# Patient Record
Sex: MALE | Race: WHITE | NOT HISPANIC OR LATINO | URBAN - METROPOLITAN AREA
[De-identification: names, ages, dates, MRNs, and addresses within clinical notes are randomized per-mention and may not be internally consistent; named-entity substitution may affect disease eponyms.]

---

## 2018-12-17 ENCOUNTER — EMERGENCY (EMERGENCY)
Facility: HOSPITAL | Age: 48
LOS: 1 days | Discharge: HOME | End: 2018-12-17
Admitting: EMERGENCY MEDICINE
Payer: COMMERCIAL

## 2018-12-17 VITALS
TEMPERATURE: 97 F | OXYGEN SATURATION: 100 % | HEART RATE: 63 BPM | SYSTOLIC BLOOD PRESSURE: 119 MMHG | RESPIRATION RATE: 16 BRPM | DIASTOLIC BLOOD PRESSURE: 83 MMHG

## 2018-12-17 VITALS — OXYGEN SATURATION: 100 % | HEART RATE: 74 BPM | RESPIRATION RATE: 17 BRPM

## 2018-12-17 LAB
ALBUMIN SERPL ELPH-MCNC: 4.2 G/DL — SIGNIFICANT CHANGE UP (ref 3.4–5)
ALP SERPL-CCNC: 78 U/L — SIGNIFICANT CHANGE UP (ref 40–120)
ALT FLD-CCNC: 32 U/L — SIGNIFICANT CHANGE UP (ref 12–42)
ANION GAP SERPL CALC-SCNC: 8 MMOL/L — LOW (ref 9–16)
AST SERPL-CCNC: 28 U/L — SIGNIFICANT CHANGE UP (ref 15–37)
BILIRUB SERPL-MCNC: 0.6 MG/DL — SIGNIFICANT CHANGE UP (ref 0.2–1.2)
BUN SERPL-MCNC: 8 MG/DL — SIGNIFICANT CHANGE UP (ref 7–23)
CALCIUM SERPL-MCNC: 9.6 MG/DL — SIGNIFICANT CHANGE UP (ref 8.5–10.5)
CHLORIDE SERPL-SCNC: 104 MMOL/L — SIGNIFICANT CHANGE UP (ref 96–108)
CO2 SERPL-SCNC: 31 MMOL/L — SIGNIFICANT CHANGE UP (ref 22–31)
CREAT SERPL-MCNC: 0.88 MG/DL — SIGNIFICANT CHANGE UP (ref 0.5–1.3)
D DIMER BLD IA.RAPID-MCNC: <187 NG/ML DDU — SIGNIFICANT CHANGE UP
GLUCOSE SERPL-MCNC: 101 MG/DL — HIGH (ref 70–99)
HCT VFR BLD CALC: 38 % — LOW (ref 39–50)
HGB BLD-MCNC: 13.1 G/DL — SIGNIFICANT CHANGE UP (ref 13–17)
MCHC RBC-ENTMCNC: 31.7 PG — SIGNIFICANT CHANGE UP (ref 27–34)
MCHC RBC-ENTMCNC: 34.5 G/DL — SIGNIFICANT CHANGE UP (ref 32–36)
MCV RBC AUTO: 92 FL — SIGNIFICANT CHANGE UP (ref 80–100)
NT-PROBNP SERPL-SCNC: 54 PG/ML — SIGNIFICANT CHANGE UP
PLATELET # BLD AUTO: 234 K/UL — SIGNIFICANT CHANGE UP (ref 150–400)
POTASSIUM SERPL-MCNC: 3.9 MMOL/L — SIGNIFICANT CHANGE UP (ref 3.5–5.3)
POTASSIUM SERPL-SCNC: 3.9 MMOL/L — SIGNIFICANT CHANGE UP (ref 3.5–5.3)
PROT SERPL-MCNC: 7.4 G/DL — SIGNIFICANT CHANGE UP (ref 6.4–8.2)
RBC # BLD: 4.13 M/UL — LOW (ref 4.2–5.8)
RBC # FLD: 12.1 % — SIGNIFICANT CHANGE UP (ref 10.3–14.5)
SODIUM SERPL-SCNC: 143 MMOL/L — SIGNIFICANT CHANGE UP (ref 132–145)
TROPONIN I SERPL-MCNC: <0.017 NG/ML — LOW (ref 0.02–0.06)
WBC # BLD: 4.8 K/UL — SIGNIFICANT CHANGE UP (ref 3.8–10.5)
WBC # FLD AUTO: 4.8 K/UL — SIGNIFICANT CHANGE UP (ref 3.8–10.5)

## 2018-12-17 PROCEDURE — 99284 EMERGENCY DEPT VISIT MOD MDM: CPT

## 2018-12-17 PROCEDURE — 71046 X-RAY EXAM CHEST 2 VIEWS: CPT | Mod: 26

## 2018-12-17 PROCEDURE — 93010 ELECTROCARDIOGRAM REPORT: CPT

## 2018-12-17 PROCEDURE — 99285 EMERGENCY DEPT VISIT HI MDM: CPT | Mod: 25

## 2018-12-17 RX ORDER — SODIUM CHLORIDE 9 MG/ML
3 INJECTION INTRAMUSCULAR; INTRAVENOUS; SUBCUTANEOUS ONCE
Qty: 0 | Refills: 0 | Status: COMPLETED | OUTPATIENT
Start: 2018-12-17 | End: 2018-12-17

## 2018-12-17 RX ADMIN — SODIUM CHLORIDE 3 MILLILITER(S): 9 INJECTION INTRAMUSCULAR; INTRAVENOUS; SUBCUTANEOUS at 15:11

## 2018-12-17 NOTE — ED ADULT NURSE NOTE - OBJECTIVE STATEMENT
c/o mid sternal chest pain and bilateral hand tingling at work. Denies SOB, diaphoresis, nausea, or lightheadedness. Denies chest pain at this time. EKG Sinus bradycardia. Seen by provider. will continue to monitor.

## 2018-12-17 NOTE — ED PROVIDER NOTE - MEDICAL DECISION MAKING DETAILS
49 y/o M presents to ED c/o transient chest pain.  Pt well appearing. VSS. NAD.  Labs wnl.  CXR negative. 49 y/o M presents to ED c/o transient chest pain.  Pt well appearing. VSS. NAD.  Labs wnl.  CXR negative.  d-dimer negative.  Pt discharged home and advised to f/u with cardiologist.

## 2018-12-17 NOTE — ED ADULT NURSE NOTE - NSIMPLEMENTINTERV_GEN_ALL_ED
Implemented All Universal Safety Interventions:  [place] to call system. Call bell, personal items and telephone within reach. Instruct patient to call for assistance. Room bathroom lighting operational. Non-slip footwear when patient is off stretcher. Physically safe environment: no spills, clutter or unnecessary equipment. Stretcher in lowest position, wheels locked, appropriate side rails in place.

## 2018-12-17 NOTE — ED PROVIDER NOTE - CARE PROVIDER_API CALL
Armando Jenkins), Cardiovascular Disease; Internal Medicine  [address] [facility]  [address] [facility] [place] [address]  Phone: [phone]  Fax: [phone]

## 2018-12-17 NOTE — CONSULT NOTE ADULT - SUBJECTIVE AND OBJECTIVE BOX
[facility] Consultation    CHIEF COMPLAINT: CP    HISTORY OF PRESENT ILLNESS: 49 y/o male seen today secondary to chest pain. The discomfort was left sided. It lasted about 20 min. Symptoms noted at rest. They went away by themselves. No prior such complains or cardiac testing. Currently, he is pain free.     PAST MEDICAL & SURGICAL HISTORY:  Varicocele    Allergies No Known Allergies    MEDICATIONS: none    FAMILY HISTORY: unremarkable    SOCIAL HISTORY:  no EtOH, tobacco or drug use    REVIEW OF SYSTEMS:  CONSTITUTIONAL: No fever, weight loss, or fatigue  EYES: No eye pain, visual disturbances, or discharge  ENMT:  No difficulty hearing, tinnitus, vertigo; No sinus or throat pain  NECK: No pain or stiffness  BREASTS: No pain, masses, or nipple discharge  RESPIRATORY: No cough, wheezing, chills or hemoptysis; No Shortness of Breath  CARDIOVASCULAR: No chest pain, palpitations, dizziness, or leg swelling  GASTROINTESTINAL: No abdominal or epigastric pain. No nausea, vomiting, or hematemesis; No diarrhea or constipation. No melena or hematochezia.  GENITOURINARY: No dysuria, frequency, hematuria, or incontinence  NEUROLOGICAL: No headaches, memory loss, loss of strength, numbness, or tremors  SKIN: No itching, burning, rashes, or lesions   LYMPH Nodes: No enlarged glands  ENDOCRINE: No heat or cold intolerance; No hair loss  MUSCULOSKELETAL: No joint pain or swelling; No muscle, back, or extremity pain  PSYCHIATRIC: No depression, anxiety, mood swings, or difficulty sleeping  HEME/LYMPH: No easy bruising, or bleeding gums  ALLERY AND IMMUNOLOGIC: No hives or eczema	      PHYSICAL EXAM:  T(C): 36.3 (12-17-18 @ 14:09), Max: 36.3 (12-17-18 @ 14:09)  HR: 74 (12-17-18 @ 17:15) (63 - 74)  BP: 119/83 (12-17-18 @ 14:09) (119/83 - 119/83)  RR: 17 (12-17-18 @ 17:15) (16 - 17)  SpO2: 100% (12-17-18 @ 17:15) (100% - 100%)      Appearance: middle aged man NAD	  HEENT:   Normal oral mucosa, PERRL, EOMI	  Lymphatic: No lymphadenopathy  Cardiovascular: Normal S1 S2, No JVD, No murmurs, No edema  Respiratory: Lungs clear to auscultation	  Psychiatry: A & O x 3, Mood & affect appropriate  Gastrointestinal:  Soft, Non-tender, + BS	  Skin: No rashes, No ecchymoses, No cyanosis	  Neurologic: Non-focal  Extremities: Normal range of motion, No clubbing, cyanosis or edema  Vascular: Peripheral pulses palpable 2+ bilaterally  	    ECG:  	SR no ST-T changes    LABS:	 	  CARDIAC MARKERS:  Troponin I, Serum: <0.017 ng/mL (12-17 @ 15:12)                       13.1   4.8   )-----------( 234      ( 17 Dec 2018 15:12 )             38.0     12-17    143  |  104  |  8   ----------------------------<  101<H>  3.9   |  31  |  0.88    Ca    9.6      17 Dec 2018 15:12    TPro  7.4  /  Alb  4.2  /  TBili  0.6  /  DBili  x   /  AST  28  /  ALT  32  /  AlkPhos  78  12-17    proBNP: Serum Pro-Brain Natriuretic Peptide: 54 pg/mL (12-17 @ 15:12)        ASSESSMENT/PLAN: 	49 y/o male atypical chest pain  1. patient seen and examined, chart reviewed  2. stable for discharge  3. happy to see in the office   4. patient could benefit from a stress test    I spent 45 min in care of this patient

## 2018-12-17 NOTE — ED PROVIDER NOTE - NSFOLLOWUPINSTRUCTIONS_ED_ALL_ED_FT
Follow up with cardiologist.  Call for follow up appointment.   Return for worsening or recurrent symptoms.

## 2018-12-17 NOTE — ED PROVIDER NOTE - OBJECTIVE STATEMENT
49 y/o M presents to ED presents to ED c/o 47 y/o M presents to ED presents to ED, sent in by MD at work for evaluation.  Pt c/o of L sided chest pain described as a localized area of tension that occurred for approx 20 minutes while seated at work.  He also notes tingling to bilat finger tips at time of pain.  He denies fevers/chills, palpitations, shortness of breath, n/v/d, diaphoresis, visual changes, cough, weakness.  Of note, pt had recent embolization of L leg on 11/12/18.  He denies FMH of CAD/MI/CVA.  Pt denies smoking history, illicit drug use.  Pt no longer has any pain.

## 2018-12-17 NOTE — ED ADULT TRIAGE NOTE - CHIEF COMPLAINT QUOTE
Patient c/o mid sternal chest pain that started at work and tingling of hands. Denies diaphoresis or SOB. EKG in progress.

## 2018-12-17 NOTE — ED PROVIDER NOTE - CARDIAC, MLM
Normal rate, regular rhythm.  Heart sounds S1, S2.  No murmurs, rubs or gallops.  no chest wall tenderness Normal rate, regular rhythm.  Heart sounds S1, S2.  No murmurs, rubs or gallops.  no chest wall tenderness, no lower leg edema

## 2018-12-21 DIAGNOSIS — R07.89 OTHER CHEST PAIN: ICD-10-CM

## 2019-08-24 NOTE — ED PROVIDER NOTE - CONSTITUTIONAL, MLM
24-Aug-2019 13:02 normal... Well appearing, well nourished, awake, alert, oriented to person, place, time/situation and in no apparent distress.

## 2022-10-28 NOTE — ED PROVIDER NOTE - CPE EDP NEURO NORM
- - - Ears: no ear pain and no hearing problems. Nose: no nasal congestion and no nasal drainage. Mouth/Throat: no dysphagia, no hoarseness and no throat pain. Neck: no lumps, no pain, no stiffness and no swollen glands.

## 2024-12-19 NOTE — ED PROVIDER NOTE - EKG #1 DATE/TIME
Procedure To Be Performed At Next Visit: Excision
Detail Level: Detailed
X Size Of Lesion In Cm (Optional): 0
Introduction Text (Please End With A Colon): The following procedure was deferred:
17-Dec-2018 17:11

## 2025-07-23 NOTE — ED ADULT NURSE NOTE - NS_NURSE_DISC_TEACHING_YN_ED_ALL_ED
Medication passed protocol.     Medication:   Accu-Chek Guide test strip 100 strip 3 8/14/2023 --    Sig: TEST BLOOD SUGAR ONCE DAILY AS  DIRECTED      SOFTCLIX LANCETS Misc 100 each 1 1/2/2024 --    Sig: TEST BLOOD SUGAR ONCE DAILY AS  DIRECTED      blood glucose meter 1 kit 0 1/3/2023 --    Sig: Test blood sugar 1 times daily as directed. Diagnosis: E11.9. Meter: dispense per insurance coverage      Last office visit date: 7/17/25  Next appointment scheduled?: Yes      Yes